# Patient Record
Sex: MALE | URBAN - METROPOLITAN AREA
[De-identification: names, ages, dates, MRNs, and addresses within clinical notes are randomized per-mention and may not be internally consistent; named-entity substitution may affect disease eponyms.]

---

## 2017-01-29 ENCOUNTER — HOSPITAL ENCOUNTER (EMERGENCY)
Facility: CLINIC | Age: 32
Discharge: HOME | End: 2017-01-29
Attending: FAMILY MEDICINE | Admitting: FAMILY MEDICINE
Payer: COMMERCIAL

## 2017-01-29 VITALS — DIASTOLIC BLOOD PRESSURE: 82 MMHG | SYSTOLIC BLOOD PRESSURE: 128 MMHG | OXYGEN SATURATION: 98 % | TEMPERATURE: 98.2 F

## 2017-01-29 DIAGNOSIS — B02.9 HERPES ZOSTER WITHOUT COMPLICATION: ICD-10-CM

## 2017-01-29 PROCEDURE — 99202 OFFICE O/P NEW SF 15 MIN: CPT | Performed by: FAMILY MEDICINE

## 2017-01-29 PROCEDURE — 99212 OFFICE O/P EST SF 10 MIN: CPT

## 2017-01-29 RX ORDER — VALACYCLOVIR HYDROCHLORIDE 1 G/1
1000 TABLET, FILM COATED ORAL 3 TIMES DAILY
Qty: 21 TABLET | Refills: 0 | Status: SHIPPED | OUTPATIENT
Start: 2017-01-29 | End: 2017-02-05

## 2017-01-29 ASSESSMENT — ENCOUNTER SYMPTOMS
RESPIRATORY NEGATIVE: 1
EYES NEGATIVE: 1
CONSTITUTIONAL NEGATIVE: 1
CARDIOVASCULAR NEGATIVE: 1
MUSCULOSKELETAL NEGATIVE: 1

## 2017-01-29 NOTE — LETTER
[facility] EMERGENCY DEPARTMENT  [address] [address]  [phone]    2017    Mark Yan  No address on file.  There are no phone numbers on file.    : 1985      To Whom it may concern:    Mark Yan was seen in our Emergency Department today, 2017.         Sincerely,        Debra Sanz

## 2017-01-29 NOTE — ED AVS SNAPSHOT
[facility] Emergency Department    [address] [address]    Phone:  [phone]    Fax:  [phone]                                       Mark Yan   MRN: [id number]    Department:  [facility] Emergency Department   Date of Visit:  1/29/2017           After Visit Summary Signature Page     I have received my discharge instructions, and my questions have been answered. I have discussed any challenges I see with this plan with the nurse or doctor.    ..........................................................................................................................................  Patient/Patient Representative Signature      ..........................................................................................................................................  Patient Representative Print Name and Relationship to Patient    ..................................................               ................................................  Date                                            Time    ..........................................................................................................................................  Reviewed by Signature/Title    ...................................................              ..............................................  Date                                                            Time

## 2017-01-29 NOTE — ED PROVIDER NOTES
History     Chief Complaint   Patient presents with     Rash     has had for 3 days getting worse, on left side of ribs      HPI  Mark Yan is a 31 year old male who presents with rash which started about three days ago.He reports that the area where the rash is , he had felt some pain and sensitivity in the area. Now with just sitting down his clothing rubbing on the skin causes pain . Patient is not sure if he had chicken pox as a child. Patient reports no fevers or chills. Patient denies any recent illness and reports that he is otherwise healthy.     I have reviewed the Medications, Allergies, Past Medical and Surgical History, and Social History in the Epic system.    Review of Systems   Constitutional: Negative.    HENT: Negative.    Eyes: Negative.    Respiratory: Negative.    Cardiovascular: Negative.    Musculoskeletal: Negative.    Skin: Positive for rash.       Physical Exam   BP: 128/82 mmHg  Heart Rate: 65  Temp: 98.2  F (36.8  C)  SpO2: 98 %  Physical Exam   Constitutional: He is oriented to person, place, and time. He appears well-developed and well-nourished. No distress.   HENT:   Head: Normocephalic and atraumatic.   Eyes: Conjunctivae and EOM are normal. Pupils are equal, round, and reactive to light.   Neck: Normal range of motion. Neck supple.   Neurological: He is alert and oriented to person, place, and time.   Skin: Rash noted. Rash is pustular. He is not diaphoretic. There is erythema.            ED Course   Procedures                 Labs Ordered and Resulted from Time of ED Arrival Up to the Time of Departure from the ED - No data to display    Assessments & Plan (with Medical Decision Making)     I have reviewed the nursing notes.    I have reviewed the findings, diagnosis, plan and need for follow up with the patient.    New Prescriptions    VALACYCLOVIR (VALTREX) 1000 MG TABLET    Take 1 tablet (1,000 mg) by mouth 3 times daily for 7 days       Final diagnoses:   Herpes zoster  without complication       1/29/2017   [facility] EMERGENCY DEPARTMENT      Debby Bowen MD  01/29/17 7801

## 2017-01-29 NOTE — ED AVS SNAPSHOT
[facility] Emergency Department    [address]    [facility] [address]    Phone:  [phone]    Fax:  [phone]                                       Mark Yan   MRN: [id number]    Department:  [facility] Emergency Department   Date of Visit:  1/29/2017           Patient Information     Date Of Birth          1985        Your diagnoses for this visit were:     Herpes zoster without complication        You were seen by Debby Bowen MD.      Follow-up Information     Please follow up.    Why:  If symptoms worsen        Discharge Instructions         Shingles  Shingles is a viral infection caused by the same virus as chicken pox. Anyone who has had chicken pox may get shingles later in life. The virus stays in the body, but remains dormant (asleep). Shingles often occurs in older persons or persons with lowered immunity. But it can affect anyone at any age.  Shingles starts as a tingling patch of skin on one side of the body. Small, painful blisters may then appear. The rash does not spread to the rest of the body.  Exposure to shingles cannot cause shingles. However, it can cause chicken pox in anyone who has not had chicken pox or has not been vaccinated. The contagious period ends when all blisters have crusted over (generally about 2 weeks after the illness begins).  After the blisters heal, the affected skin may be sensitive or painful for months (neuralgia). This often gradually goes away.  A shingles vaccine is available. This can help prevent shingles or make it less painful. It is generally recommended for adults over the age of 60 who have had chicken pox in the past, but who have never had shingles. Adults over 60 who have had neither chicken pox nor shingles can prevent both diseases with the chicken pox vaccine. Ask your healthcare provider about these vaccines.  Home care    Medicines may be prescribed to help relieve pain. Take these medicines as directed. Ask  your healthcare provider or pharmacist before using over-the-counter medicines for helping treat pain and itching.     In certain cases, antiviral medicines may be prescribed to reduce pain, shorten the illness, and prevent neuralgia. Take these medicines as directed.    Compresses made from a solution of cool water mixed with cornstarch or baking soda may help relieve pain and itching.     Gently wash skin daily with soap and water to help prevent infection.  Be certain to rinse off all of the soap, which can be irritating.    Trim fingernails and try not to scratch. Scratching the sores may leave scars.    Stay home from work or school until all blisters have formed a crust and you are no longer contagious.  Follow-up care  Follow up with your healthcare provider or as directed by our staff.  When to seek medical advice    Fever of 100.4 F (38 C) or higher, or as directed by your healthcare provider    Affected skin is on the face or neck and any of the following occur:                          Headache                          Eye pain                          Changes in vision                          Sores near the eye                          Weakness of facial muscles    Pain, redness, or swelling of a joint    Signs of skin infection: colored drainage from the sores, warmth, increasing redness, or increasing pain    9555-8858 [employer]. [address] [place] [address]. All rights reserved. This information is not intended as a substitute for professional medical care. Always follow your healthcare professional's instructions.          24 Hour Appointment Hotline       To make an appointment at any [facility], call [phone] ([phone]). If you don't have a family doctor or clinic, we will help you find one. [facility] are conveniently located to serve the needs of you and your family.             Review of your medicines      START taking        Dose /  "Directions Last dose taken    valACYclovir 1000 mg tablet   Commonly known as:  VALTREX   Dose:  1000 mg   Quantity:  21 tablet        Take 1 tablet (1,000 mg) by mouth 3 times daily for 7 days   Refills:  0                Prescriptions were sent or printed at these locations (1 Prescription)                   [facility] - [place], [place] - [address] AT [facility] [address]   [address] [facility] [facility] [address]    Telephone:  [phone]   Fax:  [phone]   Hours:                  E-Prescribed (1 of 1)         valACYclovir (VALTREX) 1000 mg tablet                Orders Needing Specimen Collection     None      Pending Results     No orders found from 2017 to 2017.            Pending Culture Results     No orders found from 2017 to 2017.             Test Results from your hospital stay            Thank you for choosing [facility]       Thank you for choosing [facility] for your care. Our goal is always to provide you with excellent care. Hearing back from our patients is one way we can continue to improve our services. Please take a few minutes to complete the written survey that you may receive in the mail after you visit with us. Thank you!        [employer] Information     [employer] lets you send messages to your doctor, view your test results, renew your prescriptions, schedule appointments and more. To sign up, go to [email or web address] . Click on \"Log in\" on the left side of the screen, which will take you to the Welcome page. Then click on \"Sign up Now\" on the right side of the page.     You will be asked to enter the access code listed below, as well as some personal information. Please follow the directions to create your username and password.     Your access code is: [email or web address]  Expires: 2017  3:56 PM     Your access code will  in 90 days. If you need help or a new code, please call your [facility] clinic or [phone].        Care " EveryWhere ID     This is your Care EveryWhere ID. This could be used by other organizations to access your [facility] medical records  [id number]        After Visit Summary       This is your record. Keep this with you and show to your community pharmacist(s) and doctor(s) at your next visit.

## 2017-01-29 NOTE — DISCHARGE INSTRUCTIONS
Shingles  Shingles is a viral infection caused by the same virus as chicken pox. Anyone who has had chicken pox may get shingles later in life. The virus stays in the body, but remains dormant (asleep). Shingles often occurs in older persons or persons with lowered immunity. But it can affect anyone at any age.  Shingles starts as a tingling patch of skin on one side of the body. Small, painful blisters may then appear. The rash does not spread to the rest of the body.  Exposure to shingles cannot cause shingles. However, it can cause chicken pox in anyone who has not had chicken pox or has not been vaccinated. The contagious period ends when all blisters have crusted over (generally about 2 weeks after the illness begins).  After the blisters heal, the affected skin may be sensitive or painful for months (neuralgia). This often gradually goes away.  A shingles vaccine is available. This can help prevent shingles or make it less painful. It is generally recommended for adults over the age of 60 who have had chicken pox in the past, but who have never had shingles. Adults over 60 who have had neither chicken pox nor shingles can prevent both diseases with the chicken pox vaccine. Ask your healthcare provider about these vaccines.  Home care    Medicines may be prescribed to help relieve pain. Take these medicines as directed. Ask your healthcare provider or pharmacist before using over-the-counter medicines for helping treat pain and itching.     In certain cases, antiviral medicines may be prescribed to reduce pain, shorten the illness, and prevent neuralgia. Take these medicines as directed.    Compresses made from a solution of cool water mixed with cornstarch or baking soda may help relieve pain and itching.     Gently wash skin daily with soap and water to help prevent infection.  Be certain to rinse off all of the soap, which can be irritating.    Trim fingernails and try not to scratch. Scratching the sores  may leave scars.    Stay home from work or school until all blisters have formed a crust and you are no longer contagious.  Follow-up care  Follow up with your healthcare provider or as directed by our staff.  When to seek medical advice    Fever of 100.4 F (38 C) or higher, or as directed by your healthcare provider    Affected skin is on the face or neck and any of the following occur:                          Headache                          Eye pain                          Changes in vision                          Sores near the eye                          Weakness of facial muscles    Pain, redness, or swelling of a joint    Signs of skin infection: colored drainage from the sores, warmth, increasing redness, or increasing pain    8747-8202 [employer]. [address] [place] [address]. All rights reserved. This information is not intended as a substitute for professional medical care. Always follow your healthcare professional's instructions.

## 2017-02-06 ENCOUNTER — TELEPHONE (OUTPATIENT)
Dept: NURSING | Facility: CLINIC | Age: 32
End: 2017-02-06

## 2017-02-06 NOTE — TELEPHONE ENCOUNTER
Call Type: Triage Call    Presenting Problem: Caller reports that león( gives permission  for her to spealk for him) was diagnosed with Shingles  on 01/29/17,  that started  below  left  scapula; treated with valtrex and only  developed a few vesicles on antrior chest nd pain was never severe;  today  developed severe pain  in med back in[place] radiates down to leg  and is unrelieved with OTC analgesia; described as severe burning  pain; no new  eruption of lesions.  has not  established  care with  PCP. Advised to return to UC or ED for assessment and treatment  Triage Note:  Guideline Title: Skin Lesions  Recommended Disposition: See Provider within 24 hours  Original Inclination: Wanted to speak with a nurse  Override Disposition:  Intended Action: Go to Urgent Care Center  Physician Contacted: No  Painful lesion(s) unrelieved with 24 hours of home care measures ?  YES  Thermal or chemical burn ? NO  Blisters on mouth OR surrounding area ? NO  Known or suspected exposure to Poison [place], [place] OR [place] ? NO  Skin tear(s) caused by friction/shear injury ? NO  Previously confirmed diagnosis of athlete's foot and similar symptoms ? NO  One or more enlarged or tender lymph nodes ? NO  Associated with new onset wheezing or difficulty breathing ? NO  Exposure to , e.g., Super Glue ? NO  Possible exposure to chickenpox or has rash that looks like chickenpox ? NO  Female with any rash, warts or sores on perineal/perianal area. ? NO  Any painful blisters on perineal/perianal area. ? NO  Male with rash, warts, or sores on penis. ? NO  Male with rash, warts, or sores on scrotum/genital area. ? NO  Non-blister lesions on mouth, lip, tongue ? NO  Skin changes that looks like hives (itchy welts or wheals) ? NO  Jaundice (yellowish tint to skin or whites of eyes) that is worsening or not  previously evaluated. ? NO  Large areas of peeling skin or new onset of large blisters over body not related  to sun/UVB exposure ?  NO  Foot or toe skin lesions complicated by diagnosed diabetes mellitus. ? NO  Localized or widespread rash that does not have the appearance of hives (itchy  welts or wheals) ? NO  Any temperature elevation in an immunocompromised individual OR frail elderly with  signs of dehydration ? NO  Any new OR worsening signs and symptoms of soft tissue infection ? NO  Known herpes zoster or undiagnosed blister-like rash on or near eye ? NO  Known herpes zoster or undiagnosed blister-like rash on tip of nose ? NO  Physician Instructions:  Care Advice: Analgesic/Antipyretic Advice - NSAIDs: Consider aspirin,  ibuprofen, naproxen or ketoprofen for pain or fever as directed on label or  by pharmacist/provider. PRECAUTIONS: - You should not take this medicine  for more than 10 days unless recommended by your provider. EXCEPTIONS: -  Should not be used if taking blood thinners or have bleeding problems. - Do  not use if have history of sensitivity/allergy to any of these medications  or history of cardiovascular, ulcer, kidney, liver disease or diabetes  unless approved by provider. - Do not exceed recommended dose or frequency.  Analgesic/Antipyretic Advice - Acetaminophen: Consider acetaminophen as  directed on label or by pharmacist/provider for pain or fever. PRECAUTIONS:  - Use if there is no history of liver disease, alcoholism, or intake of  three or more alcohol drinks per day - Only if approved by provider during  pregnancy or when breastfeeding - Do not exceed recommended dose or  frequency. Do not take more than 3000 milligrams (mg) in 24 hours. Do not  take this medicine for more than 10 days unless recommended by your  provider. - During pregnancy, acetaminophen should not be taken more than 3  consecutive days without telling provider - To make sure you don't take too  much, check other medicines you take to see if they also contain  acetaminophen.